# Patient Record
Sex: MALE | Race: WHITE | NOT HISPANIC OR LATINO | Employment: PART TIME | URBAN - METROPOLITAN AREA
[De-identification: names, ages, dates, MRNs, and addresses within clinical notes are randomized per-mention and may not be internally consistent; named-entity substitution may affect disease eponyms.]

---

## 2018-10-12 ENCOUNTER — OFFICE VISIT (OUTPATIENT)
Dept: URGENT CARE | Facility: CLINIC | Age: 33
End: 2018-10-12

## 2018-10-12 VITALS
SYSTOLIC BLOOD PRESSURE: 115 MMHG | DIASTOLIC BLOOD PRESSURE: 70 MMHG | OXYGEN SATURATION: 100 % | TEMPERATURE: 98 F | BODY MASS INDEX: 21.67 KG/M2 | RESPIRATION RATE: 18 BRPM | HEART RATE: 78 BPM | WEIGHT: 160 LBS | HEIGHT: 72 IN

## 2018-10-12 DIAGNOSIS — Z76.89 ENCOUNTER TO ESTABLISH CARE: ICD-10-CM

## 2018-10-12 DIAGNOSIS — R10.32 GROIN PAIN, LEFT: Primary | ICD-10-CM

## 2018-10-12 DIAGNOSIS — L98.9 SKIN LESION OF BACK: ICD-10-CM

## 2018-10-12 DIAGNOSIS — R53.83 FATIGUE, UNSPECIFIED TYPE: ICD-10-CM

## 2018-10-12 DIAGNOSIS — B35.6 TINEA CRURIS: ICD-10-CM

## 2018-10-12 LAB
BILIRUB UR QL STRIP: NEGATIVE
GLUCOSE UR QL STRIP: NEGATIVE
KETONES UR QL STRIP: NEGATIVE
LEUKOCYTE ESTERASE UR QL STRIP: NEGATIVE
PH, POC UA: 7
POC BLOOD, URINE: NEGATIVE
POC NITRATES, URINE: NEGATIVE
PROT UR QL STRIP: NEGATIVE
SP GR UR STRIP: 1.02 (ref 1–1.03)
UROBILINOGEN UR STRIP-ACNC: NORMAL (ref 0.3–2.2)

## 2018-10-12 PROCEDURE — 81003 URINALYSIS AUTO W/O SCOPE: CPT | Mod: QW,S$GLB,, | Performed by: NURSE PRACTITIONER

## 2018-10-12 PROCEDURE — 99000 SPECIMEN HANDLING OFFICE-LAB: CPT | Mod: S$GLB,,, | Performed by: NURSE PRACTITIONER

## 2018-10-12 PROCEDURE — 99203 OFFICE O/P NEW LOW 30 MIN: CPT | Mod: 25,S$GLB,, | Performed by: NURSE PRACTITIONER

## 2018-10-12 RX ORDER — KETOCONAZOLE 20 MG/G
CREAM TOPICAL 2 TIMES DAILY
Qty: 30 G | Refills: 0 | Status: SHIPPED | OUTPATIENT
Start: 2018-10-12 | End: 2018-10-22

## 2018-10-12 NOTE — PATIENT INSTRUCTIONS
Follow up with internal med and dermatology as we discussed. Call [phone] to schedule an appointment     Internal med- fatigue workup and labs.   Dermatology for lesion on back     If you've had labs sent out, it will generally take 4-7 days for results to return. We will call you with the results.      Please return here or go to the Emergency Department for any concerns or worsening of condition.  If you were prescribed antibiotics, please take them to completion.  If you were prescribed a narcotic medication, do not drive or operate heavy equipment or machinery while taking these medications.  Please follow up with your primary care doctor or specialist as needed.    If you  smoke, please stop smoking.      Jock Itch (Tinea Cruris, General)  Jock itch (tinea cruris) is a red, itchy rash in the groin caused by a fungal infection. It occurs in skin folds where it is warm and moist. It commonly starts as a small, red, itchy patch that grows larger. The patch is usually in the shape of a round ring, 1 to 2 inches wide. It may cause the skin to flake. It may also spread to the scrotum or the skin that covers your testicles. This infection is treated with skin creams or oral medicine.  Home care  · If you were prescribed a cream, use it exactly as directed. You can buy some antifungal creams without a prescription.  · It may take a week before the fungus starts to go away. It can take about 2 to 3 weeks to completely clear. To stop the rash from coming back, keep using the medicine until the rash is all gone.  · Wash the area at least once a day with soap and water. Pat dry and apply medicine.   · Wear loose-fitting underwear to let your skin breathe. Change your underwear daily.  · Once the rash is gone, keep the area clean and dry to prevent reinfection. If recurrence is a problem, use a medicated antifungal powder daily. This is available over the counter.  Prevention  The following tips may help prevent jock  itch:  · Don't share clothes, towels, or sports gear with others unless they have been washed.  · Change your underwear daily.  · Keep skin clean and dry, especially after showering or swimming.  · Lose weight.  · Do not wear tight underwear.  · Treat athlete's foot if it occurs.  Follow-up care  Follow up with your healthcare provider, or as advised. Call your provider if the rash is not starting to improve after 10 days of treatment, or if the rash continues to spread.  When to seek medical advice  Call your healthcare provider right away if any of these occur:  · Increasing pain in the rash area  · Redness that spreads around the rash  · Fluid draining from the rash  · Rash returns soon after treatment  · Fever of 100.4°F (38°C) or higher, or as directed by your provider  Date Last Reviewed: 8/1/2016  © 1373-6127 [employer]. [address] [place] [address]. All rights reserved. This information is not intended as a substitute for professional medical care. Always follow your healthcare professional's instructions.        Managing Fatigue     Family members can help with meals and chores around the house.   Fatigue is common. It can be caused by worry, lack of sleep, or poor appetite. Fatigue can also be a sign of anemia, a shortage of red blood cells. You might need medical treatment for anemia. The tips below can help you feel better.  Conserving energy  · Keep track of the times of day when you are most tired and plan around them. For instance, if you are more tired in the afternoon, try to get tasks done in the morning.  · Decide which tasks are most important. Do those first.  · Pass tasks along to others when you need to. Ask for help.  · Accept help when its offered. Tell people what they can do to help. For instance, you may need someone to fix a meal, fold clothes, or put gas in your car.  · Plan rest times. You may want to take a nap each day. Just sitting quietly for a few  minutes can make you feel more rested.  What you can do to feel better  · Relax before you try to sleep. Take a bath or read for a while.  · Form a sleep pattern. Go to bed at the same time each night and get up at the same time each morning.  · Eat well. Choose foods from all of the food groups each day.  · Exercise. Take a brisk walk to help increase your energy.  · Avoid caffeine and alcohol. Drink plenty of water or fruit juices instead.  Treating anemia  If you begin to feel more tired than normal, tell your doctor. Fatigue could be a sign of anemia. This problem is fairly common in cancer patients, especially during chemotherapy and radiation treatments. If your red blood cell count is too low, you may get a blood transfusion. In some cases, you may need medicine to increase the number of red blood cells your body makes.  When to call your healthcare provider  Call your healthcare provider if you have:  · Shortness of breath or chest pain  · A dizzy feeling when you get up from lying or sitting down  · Paler skin than normal  · Extreme tiredness that is not helped by sleep   Date Last Reviewed: 1/3/2016  © 4389-8961 [employer]. [address] [place] [address], [place] [address]. All rights reserved. This information is not intended as a substitute for professional medical care. Always follow your healthcare professional's instructions.

## 2018-10-13 LAB
C TRACH RRNA SPEC QL NAA+PROBE: NEGATIVE
N GONORRHOEA RRNA SPEC QL NAA+PROBE: NEGATIVE

## 2019-02-07 ENCOUNTER — OFFICE VISIT (OUTPATIENT)
Dept: URGENT CARE | Facility: CLINIC | Age: 34
End: 2019-02-07

## 2019-02-07 VITALS
HEART RATE: 76 BPM | DIASTOLIC BLOOD PRESSURE: 69 MMHG | HEIGHT: 72 IN | SYSTOLIC BLOOD PRESSURE: 111 MMHG | WEIGHT: 160 LBS | BODY MASS INDEX: 21.67 KG/M2 | RESPIRATION RATE: 16 BRPM | OXYGEN SATURATION: 98 % | TEMPERATURE: 97 F

## 2019-02-07 DIAGNOSIS — R30.0 DYSURIA: ICD-10-CM

## 2019-02-07 DIAGNOSIS — N34.2 URETHRITIS: Primary | ICD-10-CM

## 2019-02-07 LAB
BILIRUB UR QL STRIP: NEGATIVE
GLUCOSE UR QL STRIP: NEGATIVE
KETONES UR QL STRIP: NEGATIVE
LEUKOCYTE ESTERASE UR QL STRIP: NEGATIVE
PH, POC UA: 6 (ref 5–8)
POC BLOOD, URINE: NEGATIVE
POC NITRATES, URINE: NEGATIVE
PROT UR QL STRIP: NEGATIVE
SP GR UR STRIP: 1.02 (ref 1–1.03)
UROBILINOGEN UR STRIP-ACNC: NEGATIVE (ref 0.3–2.2)

## 2019-02-07 PROCEDURE — 99000 PR SPECIMEN HANDLING,DR OFF->LAB: ICD-10-PCS | Mod: S$GLB,,, | Performed by: EMERGENCY MEDICINE

## 2019-02-07 PROCEDURE — 99000 SPECIMEN HANDLING OFFICE-LAB: CPT | Mod: S$GLB,,, | Performed by: EMERGENCY MEDICINE

## 2019-02-07 PROCEDURE — 81003 URINALYSIS AUTO W/O SCOPE: CPT | Mod: QW,S$GLB,, | Performed by: EMERGENCY MEDICINE

## 2019-02-07 PROCEDURE — 99214 PR OFFICE/OUTPT VISIT, EST, LEVL IV, 30-39 MIN: ICD-10-PCS | Mod: 25,S$GLB,, | Performed by: EMERGENCY MEDICINE

## 2019-02-07 PROCEDURE — 81003 POCT URINALYSIS, DIPSTICK, AUTOMATED, W/O SCOPE: ICD-10-PCS | Mod: QW,S$GLB,, | Performed by: EMERGENCY MEDICINE

## 2019-02-07 PROCEDURE — 99214 OFFICE O/P EST MOD 30 MIN: CPT | Mod: 25,S$GLB,, | Performed by: EMERGENCY MEDICINE

## 2019-02-07 RX ORDER — AZITHROMYCIN 500 MG/1
1000 TABLET, FILM COATED ORAL ONCE
Qty: 2 TABLET | Refills: 0 | Status: SHIPPED | OUTPATIENT
Start: 2019-02-07 | End: 2019-02-07

## 2019-02-07 RX ORDER — AZITHROMYCIN 250 MG/1
TABLET, FILM COATED ORAL
Qty: 6 TABLET | Refills: 0 | Status: SHIPPED | OUTPATIENT
Start: 2019-02-07 | End: 2019-02-07 | Stop reason: CLARIF

## 2019-02-07 RX ORDER — VALACYCLOVIR HYDROCHLORIDE 1 G/1
TABLET, FILM COATED ORAL
COMMUNITY
Start: 2016-01-21

## 2019-02-07 RX ORDER — HYDROCODONE BITARTRATE AND ACETAMINOPHEN 5; 325 MG/1; MG/1
1 TABLET ORAL
COMMUNITY
Start: 2016-01-21

## 2019-02-08 NOTE — PATIENT INSTRUCTIONS
We will contact you if culture results are positive for other bacteria tested and that would indicate the need to come back in for a shot of Rocephin.  Azithromycin prescription covering you for chlamydial infection of the urethra.  Quick urinalysis negative for bacterial bladder infection.  Review urethritis sheet.  Return for any concerns or problems.  We will call you with the results as soon as they come in.        Urethritis, Infection vs. Chemical (Adult Male)  You have urethritis. This means an inflammation in your urethra. The urethra is the tube that drains the urine out of your bladder through the tip of the penis. Urethritis is most often caused by a bacterial infection. The infection may be from gonorrhea, chlamydia, or another sexually transmitted disease (STD). But other things can also cause it. These things include irritation from soap, lotion, deodorant, or spermicides. The cause of your urethritis is uncertain.  Women with urethritis often don't have symptoms. Men are more likely to have symptoms. Symptoms can start within 1 week to a month or more after infection. Symptoms can include:  · Burning or pain when urinating  · Your penis feels irritated  · Pus coming from your penis  · Pain and possible swelling in one or both testicles  Urethritis caused by bacteria is treated with antibiotics. Urethritis may clear up in a few weeks or months, even without treatment. But if you don't get treatment, the bacteria that cause the infection can stay in the urethra. Even if symptoms go away, you can still have the infection. And you can spread it to others.  Your sex partner or partners also need to be treated. This is true even if they have no symptoms. You can get infected again if they aren't treated and you have sex with them. Your partner should call his or her healthcare provider to be looked at and treated.  Your urethritis may also be caused by things other than bacteria. These causes  include:  · Chemical irritation from a product used in the genital area. This might be soap, lotions, deodorant, or spermicides. Symptoms usually get better within 3 days after the last time you used the product.  · Damage to the urethra caused by vigorous sex or masturbation  · Damage to the urethra caused by inserting an object into it. This could happen during an operation in the hospital. A thin, plastic tube (catheter) is put into your bladder to let urine to drain from the bladder during the operation.  · Long-term (chronic) urethritis that lasts for weeks or months, or goes away and comes back. This kind of urethritis may be caused by a narrowed urethra or an untreated bacterial infection. You may need to see a specialist for diagnosis and treatment.  Home care  The following guidelines will help you care for yourself at home:  · Stop using any soap, lotions, or other chemicals that may cause irritation.  · If you were given antibiotics, take them until they are all gone, or until your healthcare provider tells you to stop. It's important to finish the antibiotics even if your symptoms go away. This is to make sure the infection has completely cleared up.  · Don't have sex until both you and your partner have finished all of the antibiotics, and your provider tells you that you cannot pass on the infection.  · You can take acetaminophen or ibuprofen for pain, unless you were given a different pain medicine to use. If you have chronic liver or kidney disease, talk with your provider before taking these medicines. Also talk with your provider if you've had a stomach ulcer or GI bleeding or are taking blood thinner medicines.  · Dont give aspirin to anyone under 18 years of age who is ill with a fever. It may cause severe liver damage.  · Learn about safe sex practices and use them. The safest sex is with a partner who does not have an STD and only has sex with you. Condoms can keep you from getting some  STDs. These include gonorrhea, chlamydia, and HIV. But condoms are not a guarantee you will not get these diseases.  Follow-up care  Follow up with your healthcare provider, or as advised. If an STD culture was taken, call as directed for the result. If you are diagnosed with an STD, follow up with your provider or your local health department. You should have a complete STD screening, including HIV testing. For more information, call the CDC-INFO at 797-338-5023.  When to seek medical advice  Call your healthcare provider right away if any of these occur:  · You don't start to get better after 3 days of treatment.  · You cant urinate because of the pain  · Rash or joint pain  · Painful sores on the penis  · Enlarged, painful lumps (lymph nodes) in your groin  · Testicle pain or your scrotum swells   Date Last Reviewed: 10/1/2016  © 5239-0823 [employer]. [address] [place] [address], [place] [address]. All rights reserved. This information is not intended as a substitute for professional medical care. Always follow your healthcare professional's instructions.

## 2019-02-08 NOTE — PROGRESS NOTES
Subjective:       Patient ID: Kishan Huff is a 33 y.o. male.    Vitals:    02/07/19 1806   BP: 111/69   Pulse: 76   Resp: 16   Temp: 97.2 °F (36.2 °C)   TempSrc: Oral   SpO2: 98%   Weight: 72.6 kg (160 lb)   Height: 6' (1.829 m)       Chief Complaint: Dysuria    Patient has had urinary symptoms for about a week.  Patient does have a history of chlamydial infection however denies any sort of penile discharge or testicular swelling or fevers or chills or abdominal pain. He states some mild end extreme urgency or incomplete voiding.  I have given him all options of testing and treat, treating and testing later, testing only, treating only and he has chosen the option of testing for both gonorrhea and chlamydia and empirically treating for chlamydia and that if the gonorrhea is positive he will come back in for the Rocephin shot.  He denies hematuria he denies flank pain he denies penile rash or lesions or scrotal swelling or testicular pain.      Dysuria    This is a new problem. Episode onset: 1 week. The problem occurs every urination. The problem has been gradually worsening. The quality of the pain is described as aching (pressure). The pain is at a severity of 4/10. There has been no fever. He is sexually active. There is no history of pyelonephritis. Associated symptoms include hesitancy. Pertinent negatives include no chills, hematuria, nausea, urgency, vomiting or rash. He has tried nothing for the symptoms.     Review of Systems   Constitution: Negative for chills and fever.   Eyes: Negative for discharge.   Skin: Negative for flushing and rash.   Musculoskeletal: Negative for back pain.   Gastrointestinal: Negative for nausea and vomiting.   Genitourinary: Positive for dysuria and hesitancy. Negative for genital sores, hematuria and urgency.        Skin near scrotum feels sticky  Urine leaks out after urination       Objective:      Physical Exam   Constitutional: He is oriented to person, place,  and time. He appears well-developed and well-nourished. No distress.   HENT:   Head: Normocephalic and atraumatic.   Right Ear: External ear normal.   Left Ear: External ear normal.   Nose: Nose normal. No nasal deformity. No epistaxis.   Mouth/Throat: Oropharynx is clear and moist and mucous membranes are normal.   Eyes: Conjunctivae and lids are normal.   Neck: Trachea normal, normal range of motion and phonation normal. Neck supple.   Cardiovascular: Normal rate, regular rhythm, normal heart sounds and intact distal pulses.   Pulmonary/Chest: Effort normal and breath sounds normal.   Abdominal: Soft. Normal appearance and bowel sounds are normal. He exhibits no distension. There is no tenderness. There is no CVA tenderness.   Musculoskeletal: Normal range of motion.   Neurological: He is alert and oriented to person, place, and time. He has normal reflexes.   Skin: Skin is warm, dry and intact. He is not diaphoretic.   Psychiatric: He has a normal mood and affect. His speech is normal and behavior is normal. Judgment and thought content normal. Cognition and memory are normal.   Nursing note and vitals reviewed.        Office Visit on 02/07/2019   Component Date Value Ref Range Status    POC Blood, Urine 02/07/2019 Negative  Negative Final    POC Bilirubin, Urine 02/07/2019 Negative  Negative Final    POC Urobilinogen, Urine 02/07/2019 Negative  0.3 - 2.2 Final    POC Ketones, Urine 02/07/2019 Negative  Negative Final    POC Protein, Urine 02/07/2019 Negative  Negative Final    POC Nitrates, Urine 02/07/2019 Negative  Negative Final    POC Glucose, Urine 02/07/2019 Negative  Negative Final    pH, UA 02/07/2019 6.0  5 - 8 Final    POC Specific Gravity, Urine 02/07/2019 1.025  1.003 - 1.029 Final    POC Leukocytes, Urine 02/07/2019 Negative  Negative Final       Assessment:       1. Urethritis    2. Dysuria        Plan:       Kishan was seen today for dysuria.    Diagnoses and all orders for this  visit:    Urethritis  -     C. trachomatis/N. gonorrhoeae by AMP DNA [facility]; Urine    Dysuria  -     POCT Urinalysis, Dipstick, Automated, W/O Scope  -     C. trachomatis/N. gonorrhoeae by AMP DNA [facility]; Urine    Other orders  -     Discontinue: azithromycin (Z-AMALIA) 250 MG tablet; Take 2 tablets by mouth on day 1; Take 1 tablet by mouth on days 2-5  -     azithromycin (ZITHROMAX) 500 MG tablet; Take 2 tablets (1,000 mg total) by mouth once. for 1 dose          Patient Instructions   We will contact you if culture results are positive for other bacteria tested and that would indicate the need to come back in for a shot of Rocephin.  Azithromycin prescription covering you for chlamydial infection of the urethra.  Quick urinalysis negative for bacterial bladder infection.  Review urethritis sheet.  Return for any concerns or problems.  We will call you with the results as soon as they come in.        Urethritis, Infection vs. Chemical (Adult Male)  You have urethritis. This means an inflammation in your urethra. The urethra is the tube that drains the urine out of your bladder through the tip of the penis. Urethritis is most often caused by a bacterial infection. The infection may be from gonorrhea, chlamydia, or another sexually transmitted disease (STD). But other things can also cause it. These things include irritation from soap, lotion, deodorant, or spermicides. The cause of your urethritis is uncertain.  Women with urethritis often don't have symptoms. Men are more likely to have symptoms. Symptoms can start within 1 week to a month or more after infection. Symptoms can include:  · Burning or pain when urinating  · Your penis feels irritated  · Pus coming from your penis  · Pain and possible swelling in one or both testicles  Urethritis caused by bacteria is treated with antibiotics. Urethritis may clear up in a few weeks or months, even without treatment. But if you don't get treatment, the bacteria that  cause the infection can stay in the urethra. Even if symptoms go away, you can still have the infection. And you can spread it to others.  Your sex partner or partners also need to be treated. This is true even if they have no symptoms. You can get infected again if they aren't treated and you have sex with them. Your partner should call his or her healthcare provider to be looked at and treated.  Your urethritis may also be caused by things other than bacteria. These causes include:  · Chemical irritation from a product used in the genital area. This might be soap, lotions, deodorant, or spermicides. Symptoms usually get better within 3 days after the last time you used the product.  · Damage to the urethra caused by vigorous sex or masturbation  · Damage to the urethra caused by inserting an object into it. This could happen during an operation in the hospital. A thin, plastic tube (catheter) is put into your bladder to let urine to drain from the bladder during the operation.  · Long-term (chronic) urethritis that lasts for weeks or months, or goes away and comes back. This kind of urethritis may be caused by a narrowed urethra or an untreated bacterial infection. You may need to see a specialist for diagnosis and treatment.  Home care  The following guidelines will help you care for yourself at home:  · Stop using any soap, lotions, or other chemicals that may cause irritation.  · If you were given antibiotics, take them until they are all gone, or until your healthcare provider tells you to stop. It's important to finish the antibiotics even if your symptoms go away. This is to make sure the infection has completely cleared up.  · Don't have sex until both you and your partner have finished all of the antibiotics, and your provider tells you that you cannot pass on the infection.  · You can take acetaminophen or ibuprofen for pain, unless you were given a different pain medicine to use. If you have chronic  liver or kidney disease, talk with your provider before taking these medicines. Also talk with your provider if you've had a stomach ulcer or GI bleeding or are taking blood thinner medicines.  · Dont give aspirin to anyone under 18 years of age who is ill with a fever. It may cause severe liver damage.  · Learn about safe sex practices and use them. The safest sex is with a partner who does not have an STD and only has sex with you. Condoms can keep you from getting some STDs. These include gonorrhea, chlamydia, and HIV. But condoms are not a guarantee you will not get these diseases.  Follow-up care  Follow up with your healthcare provider, or as advised. If an STD culture was taken, call as directed for the result. If you are diagnosed with an STD, follow up with your provider or your local health department. You should have a complete STD screening, including HIV testing. For more information, call the CDC-INFO at 497-016-9257.  When to seek medical advice  Call your healthcare provider right away if any of these occur:  · You don't start to get better after 3 days of treatment.  · You cant urinate because of the pain  · Rash or joint pain  · Painful sores on the penis  · Enlarged, painful lumps (lymph nodes) in your groin  · Testicle pain or your scrotum swells   Date Last Reviewed: 10/1/2016  © 4071-9328 [employer]. [address] [place] [address], [place] [address]. All rights reserved. This information is not intended as a substitute for professional medical care. Always follow your healthcare professional's instructions.

## 2019-02-12 LAB
C TRACH RRNA SPEC QL NAA+PROBE: NEGATIVE
N GONORRHOEA RRNA SPEC QL NAA+PROBE: NEGATIVE

## 2019-07-29 ENCOUNTER — OFFICE VISIT (OUTPATIENT)
Dept: URGENT CARE | Facility: CLINIC | Age: 34
End: 2019-07-29

## 2019-07-29 VITALS
HEART RATE: 74 BPM | WEIGHT: 170 LBS | TEMPERATURE: 98 F | HEIGHT: 72 IN | OXYGEN SATURATION: 99 % | DIASTOLIC BLOOD PRESSURE: 69 MMHG | SYSTOLIC BLOOD PRESSURE: 106 MMHG | BODY MASS INDEX: 23.03 KG/M2 | RESPIRATION RATE: 20 BRPM

## 2019-07-29 DIAGNOSIS — R30.0 DYSURIA: ICD-10-CM

## 2019-07-29 DIAGNOSIS — R10.9 ACUTE RIGHT FLANK PAIN: Primary | ICD-10-CM

## 2019-07-29 DIAGNOSIS — R82.90 ABNORMAL URINE FINDING: ICD-10-CM

## 2019-07-29 LAB
BILIRUB UR QL STRIP: POSITIVE
GLUCOSE UR QL STRIP: NEGATIVE
KETONES UR QL STRIP: NEGATIVE
LEUKOCYTE ESTERASE UR QL STRIP: POSITIVE
PH, POC UA: 5.5 (ref 5–8)
POC BLOOD, URINE: NEGATIVE
POC NITRATES, URINE: NEGATIVE
PROT UR QL STRIP: POSITIVE
SP GR UR STRIP: 1.03 (ref 1–1.03)
UROBILINOGEN UR STRIP-ACNC: NORMAL (ref 0.3–2.2)

## 2019-07-29 PROCEDURE — 99203 PR OFFICE/OUTPT VISIT, NEW, LEVL III, 30-44 MIN: ICD-10-PCS | Mod: 25,S$GLB,, | Performed by: EMERGENCY MEDICINE

## 2019-07-29 PROCEDURE — 74018 XR ABDOMEN AP 1 VIEW: ICD-10-PCS | Mod: TIER,S$GLB,, | Performed by: RADIOLOGY

## 2019-07-29 PROCEDURE — 81003 POCT URINALYSIS, DIPSTICK, AUTOMATED, W/O SCOPE: ICD-10-PCS | Mod: QW,S$GLB,, | Performed by: EMERGENCY MEDICINE

## 2019-07-29 PROCEDURE — 96372 THER/PROPH/DIAG INJ SC/IM: CPT | Mod: S$GLB,,, | Performed by: EMERGENCY MEDICINE

## 2019-07-29 PROCEDURE — 87086 URINE CULTURE/COLONY COUNT: CPT

## 2019-07-29 PROCEDURE — 81003 URINALYSIS AUTO W/O SCOPE: CPT | Mod: QW,S$GLB,, | Performed by: EMERGENCY MEDICINE

## 2019-07-29 PROCEDURE — 96372 PR INJECTION,THERAP/PROPH/DIAG2ST, IM OR SUBCUT: ICD-10-PCS | Mod: S$GLB,,, | Performed by: EMERGENCY MEDICINE

## 2019-07-29 PROCEDURE — 99000 PR SPECIMEN HANDLING,DR OFF->LAB: ICD-10-PCS | Mod: S$GLB,,, | Performed by: EMERGENCY MEDICINE

## 2019-07-29 PROCEDURE — 99203 OFFICE O/P NEW LOW 30 MIN: CPT | Mod: 25,S$GLB,, | Performed by: EMERGENCY MEDICINE

## 2019-07-29 PROCEDURE — 99000 SPECIMEN HANDLING OFFICE-LAB: CPT | Mod: S$GLB,,, | Performed by: EMERGENCY MEDICINE

## 2019-07-29 PROCEDURE — 74018 RADEX ABDOMEN 1 VIEW: CPT | Mod: TIER,S$GLB,, | Performed by: RADIOLOGY

## 2019-07-29 RX ORDER — CIPROFLOXACIN 500 MG/1
500 TABLET ORAL EVERY 12 HOURS
Qty: 14 TABLET | Refills: 0 | Status: SHIPPED | OUTPATIENT
Start: 2019-07-29 | End: 2019-08-05

## 2019-07-29 RX ORDER — IBUPROFEN 600 MG/1
600 TABLET ORAL EVERY 6 HOURS PRN
Qty: 20 TABLET | Refills: 0 | Status: SHIPPED | OUTPATIENT
Start: 2019-07-29 | End: 2019-08-03

## 2019-07-29 RX ORDER — KETOROLAC TROMETHAMINE 30 MG/ML
30 INJECTION, SOLUTION INTRAMUSCULAR; INTRAVENOUS
Status: COMPLETED | OUTPATIENT
Start: 2019-07-29 | End: 2019-07-29

## 2019-07-29 RX ADMIN — KETOROLAC TROMETHAMINE 30 MG: 30 INJECTION, SOLUTION INTRAMUSCULAR; INTRAVENOUS at 09:07

## 2019-07-29 NOTE — PROGRESS NOTES
Subjective:       Patient ID: Kishan Huff is a 34 y.o. male.    Vitals:  height is 6' (1.829 m) and weight is 77.1 kg (170 lb). His temperature is 97.5 °F (36.4 °C). His blood pressure is 106/69 and his pulse is 74. His respiration is 20 and oxygen saturation is 99%.     Chief Complaint: Back Pain (More on the right side )    Patient is a 34-year-old male without any significant past medical history presenting to the urgent care clinic with complaints of 8 or 9 days of symptoms which include a dull pulling type of pain of the right flank into the right lower abdomen wall and right groin.  He states intermittently over the last week he has had some very mild dysuria described as burning.  No penile discharge no blood in the urine.  He states the pain is present every single day.  He has not had any change of appetite or fever or chills or nausea or vomiting or diarrhea.  He started working out about 10-12 days ago and it has been cardiovascular exercise like doing the ellipse.  He states that once or twice before exactly like this after starting exercise he has had the exact same problem with pain in the right flank and lower groin on the right.  He has no subjective concern for STD.    Back Pain   This is a new problem. The current episode started in the past 7 days (Started 8 days ago ). The problem occurs constantly. The problem is unchanged. Quality: gen pain  The pain is at a severity of 5/10. The pain is mild. Associated symptoms include abdominal pain. Pertinent negatives include no chest pain, dysuria, fever or headaches. (Pain start from the abdominal to the back ) He has tried ice for the symptoms. The treatment provided no relief.       Constitution: Negative for chills, fatigue and fever.   HENT: Negative for congestion and sore throat.    Neck: Negative for painful lymph nodes.   Cardiovascular: Negative for chest pain and leg swelling.   Eyes: Negative for double vision and blurred vision.    Respiratory: Negative for cough and shortness of breath.    Gastrointestinal: Positive for abdominal pain. Negative for nausea, vomiting and diarrhea.   Genitourinary: Negative for dysuria, frequency and urgency.   Musculoskeletal: Positive for pain and back pain. Negative for joint pain, joint swelling, muscle cramps and muscle ache.   Skin: Negative for color change, pale and rash.   Allergic/Immunologic: Negative for seasonal allergies.   Neurological: Negative for dizziness, history of vertigo, light-headedness, passing out and headaches.   Hematologic/Lymphatic: Negative for swollen lymph nodes, easy bruising/bleeding and history of blood clots. Does not bruise/bleed easily.   Psychiatric/Behavioral: Negative for nervous/anxious, sleep disturbance and depression. The patient is not nervous/anxious.        Objective:      Physical Exam   Constitutional: He is oriented to person, place, and time. Vital signs are normal. He appears well-developed and well-nourished. He is active and cooperative. No distress.   HENT:   Head: Normocephalic and atraumatic.   Nose: Nose normal.   Mouth/Throat: Oropharynx is clear and moist and mucous membranes are normal.   Eyes: Conjunctivae and lids are normal.   Neck: Trachea normal, normal range of motion, full passive range of motion without pain and phonation normal. Neck supple.   Cardiovascular: Normal rate, regular rhythm, normal heart sounds, intact distal pulses and normal pulses.   Pulmonary/Chest: Effort normal and breath sounds normal.   Abdominal: Soft. Normal appearance and bowel sounds are normal. He exhibits no abdominal bruit, no pulsatile midline mass and no mass.   Musculoskeletal: He exhibits no edema, tenderness or deformity.   Currently is not having pain and not tender to palpation of the right back or flank or side or abdominal wall or groin.  There is no pain on hip flexion or external or internal rotation.  Right lower quadrant is nontender and certainly  no rebound.  Allows full palpation of the entire abdomen without tenderness to palpation.  No hernia noted no skin lesions noted.   Neurological: He is alert and oriented to person, place, and time. He has normal strength and normal reflexes. No sensory deficit.   Skin: Skin is warm, dry and intact. He is not diaphoretic.   Psychiatric: He has a normal mood and affect. His speech is normal and behavior is normal. Judgment and thought content normal. Cognition and memory are normal.   Nursing note and vitals reviewed.      Differential diagnosis includes intra-abdominal processes including cholecystitis cholelithiasis appendicitis uro lithiasis UTI , however with lack of fever, change in appetite, nausea or vomiting, as well as lack of tenderness to palpation in the abdomen, as well as timeframe of 8 or 9 days of consistent symptoms, much less likely to be acute intra-abdominal emergency.  He has had some mild dysuria described as a burning sensation at times associated with the right-sided flank pain and also states that the pain started 2-3 days after exercising which includes doing the ellipse with elongation of the hip flexor muscles and thigh muscles and abdominal wall muscles with a twisting motion.  He has no history of kidney stones.  Throat and does not meet requirements for CT scan or emergent labs, however given specific instructions for appendicitis and cholecystitis and other ER precautions for the same.  Will get urinalysis, 30 mg of Toradol shot given and get KUB to assess for constipation, dilated loops of bowel, kidney stone, any blood in the urine or infection.  Will treat with anti-inflammatory at this time.  Given follow-up.    Office Visit on 07/29/2019   Component Date Value Ref Range Status    POC Blood, Urine 07/29/2019 Negative  Negative Final    POC Bilirubin, Urine 07/29/2019 Positive* Negative Final    0.5    POC Urobilinogen, Urine 07/29/2019 Normal  0.3 - 2.2 Final    POC Ketones,  Urine 07/29/2019 Negative  Negative Final    POC Protein, Urine 07/29/2019 Positive* Negative Final    10    POC Nitrates, Urine 07/29/2019 Negative  Negative Final    POC Glucose, Urine 07/29/2019 Negative  Negative Final    pH, UA 07/29/2019 5.5  5 - 8 Final    POC Specific Gravity, Urine 07/29/2019 1.030* 1.003 - 1.029 Final    POC Leukocytes, Urine 07/29/2019 Positive* Negative Final    10     X-ray Abdomen Ap 1 View    Result Date: 7/29/2019  EXAMINATION: XR ABDOMEN AP 1 VIEW CLINICAL HISTORY: flank pain; Unspecified abdominal pain TECHNIQUE: AP View(s) of the abdomen was performed. COMPARISON: None FINDINGS: No bowel dilatation.  No definite free air in the abdomen.  Small pelvic calcification probably phleboliths but a small distal ureteral calculus cannot be ruled out.  No definite intrarenal calcifications.     See above Electronically signed by: Tony Villeda MD Date:    07/29/2019 Time:    09:59      Assessment:       1. Acute right flank pain    2. Abnormal urine finding    3. Dysuria        Plan:         Acute right flank pain  -     POCT Urinalysis, Dipstick, Automated, W/O Scope  -     X-Ray Abdomen AP 1 View; Future; Expected date: 07/29/2019  -     Culture, Urine    Abnormal urine finding  -     Culture, Urine    Dysuria  -     X-Ray Abdomen AP 1 View; Future; Expected date: 07/29/2019    Other orders  -     ketorolac injection 30 mg  -     ciprofloxacin HCl (CIPRO) 500 MG tablet; Take 1 tablet (500 mg total) by mouth every 12 (twelve) hours. for 7 days  Dispense: 14 tablet; Refill: 0  -     ibuprofen (ADVIL,MOTRIN) 600 MG tablet; Take 1 tablet (600 mg total) by mouth every 6 (six) hours as needed for Pain or Temperature greater than (100.4).  Dispense: 20 tablet; Refill: 0          Patient Instructions   Ciprofloxacin prescription antibiotic until culture results are back  Ibuprofen 600 mg prescription for pain and inflammation  Urinalysis abnormal however not definitive for kidney stone or  urinary tract infection so sent for a culture.  We will cover you empirically with antibiotics until the culture results and we will call you with results in about 2-3 business days  X-ray shows some nonspecific findings including increased amount of stool, a calcification on the right side which may be phlebolith in the stool or a small kidney stone.  Keep your stool soft with MiraLax or Colace twice daily and avoid straining.  Review flank pain and abdominal pain sheet  Take over-the-counter AZO for any persistent painful or burning urination    Please go to the emergency department if having failure to improve, fevers or vomiting with this pain, tenderness in the right lower quadrant of the abdomen as this would be more concerning for appendicitis.  There is also no hernia present at this time.    Follow-up with PCP    Review discharge instruction sheets including abdominal pain, flank pain, dysuria.      Flank Pain, Uncertain Cause  The flank is the area between your upper abdomen and your back. Pain there is often caused by a problem with your kidneys. It might be a kidney infection or a kidney stone. Other causes of flank pain include spinal arthritis, a pinched nerve from a back injury, or a back muscle strain or spasm.  The cause of your flank pain is not certain. You may need other tests.  Home care  Follow these tips when caring for yourself at home:  · You may use acetaminophen or ibuprofen to control pain, unless your health care provider prescribed another medicine. If you have chronic liver or kidney disease, talk with your provider before taking these medicines. Also talk with your provider first if youve ever had a stomach ulcer or GI bleeding.  · If the pain is coming from your muscles, you may get relief with ice or heat. During the first 2 days after the injury, put an ice pack on the painful area for 20 minutes every 2 to 4 hours. This will reduce swelling and pain. A hot shower, hot bath, or  heating pad works well for a muscle spasm. You can start with ice, then switch to heat after 2 days. You might find that alternating ice and heat works well. Use the method that feels the best to you.  Follow-up care  Follow up with your healthcare provider if your symptoms dont get better over the next few days.  When to seek medical advice  Call your healthcare provider right away if any of these happen:  · Repeated vomiting  · Fever of 100.4ºF (38ºC) or higher, or as directed by your health care provider  · Flank pain that gets worse  · Pain that spreads to the front of your belly (abdomen)  · Dizziness, weakness, or fainting  · Blood in your urine  · Burning feeling when you urinate or the need to urinate often  · Pain in one of your legs that gets worse  · Numbness or weakness in a leg  Date Last Reviewed: 10/1/2016  © 2544-0236 [employer]. [address] [place] [address]. All rights reserved. This information is not intended as a substitute for professional medical care. Always follow your healthcare professional's instructions.        Dysuria with Uncertain Cause (Adult)    The urethra is the tube that allows urine to pass out of the body. In a woman, the urethra is the opening above the vagina. In men, the urethra is the opening on the tip of the penis. Dysuria is the feeling of pain or burning in the urethra when passing urine.  Dysuria can be caused by anything that irritates or inflames the urethra. An infection or chemical irritation can cause this reaction. A bladder infection is the most common cause of dysuria in adults. A urine test can diagnose this. A bladder infection needs antibiotic treatment.  Soaps, lotions, colognes and feminine hygiene products can cause dysuria. So can birth control jellies, creams, and foams. It will go away 1 to 3 days after using these irritants.  Sexually transmitted diseases (STDs) such as chlamydia or gonorrhea can cause dysuria. Your  healthcare provider may take a culture sample. Your provider may start you on antibiotic medicine before the culture test returns.  In women who have gone through menopause, dysuria can be from dryness in the lining of the urethra. This can be treated with hormones. Dysuria becomes long-term (chronic) when it lasts for weeks or months. You may need to see a specialist (urologist) to diagnose and treat chronic dysuria.  Home care  These home care tips may help:  · Don't use any chemicals or products that you think may be causing your symptoms.  · If you were given a prescription medicine, take as directed. Be sure to take it until it is all used up.  · If a culture was taken, don't have sex until you have been told that it is negative. This means you don't have an infection. Then follow your healthcare provider's advice to treat your condition.  If a culture was done and it is positive:  · Both you and your sexual partner may need to be treated. This is true even if your partner has no symptoms.  · Contact your healthcare provider or go to an urgent care clinic or the public health department to be looked at and treated.  · Don't have sex until both you and your partner(s) have finished all antibiotics and your healthcare provider says you are no longer contagious.  · Learn about and use safe sex practices. The safest sex is with a partner who has tested negative and only has sex with you. Condoms can prevent STDs from spreading, but they aren't a guarantee.  Follow-up care  Follow up with your healthcare provider, or as advised. If a culture was taken, you may call as directed for the results. If you have an STD, follow up with your provider or the public health department for a complete STD screening, including HIV testing. For more information, contact CDC-INFO at 408-067-9971.  When to seek medical advice  Call your healthcare provider right away if any of these occur:  · You aren't better after 3 days of  treatment  · Fever of 100.4ºF (38ºC) or higher, or as directed by your healthcare provider  · Back or belly pain that gets worse  · You can't urinate because of pain  · New discharge from the urethra, vagina, or penis  · Painful sores on the penis  · Rash or joint pain  · Painful lumps (lymph nodes) in the groin  · Testicle pain or swelling of the scrotum  Date Last Reviewed: 11/1/2016 © 2000-2017 [employer]. [address] [place] [address]. All rights reserved. This information is not intended as a substitute for professional medical care. Always follow your healthcare professional's instructions.

## 2019-07-29 NOTE — PATIENT INSTRUCTIONS
Ciprofloxacin prescription antibiotic until culture results are back  Ibuprofen 600 mg prescription for pain and inflammation  Urinalysis abnormal however not definitive for kidney stone or urinary tract infection so sent for a culture.  We will cover you empirically with antibiotics until the culture results and we will call you with results in about 2-3 business days  X-ray shows some nonspecific findings including increased amount of stool, a calcification on the right side which may be phlebolith in the stool or a small kidney stone.  Keep your stool soft with MiraLax or Colace twice daily and avoid straining.  Review flank pain and abdominal pain sheet  Take over-the-counter AZO for any persistent painful or burning urination    Please go to the emergency department if having failure to improve, fevers or vomiting with this pain, tenderness in the right lower quadrant of the abdomen as this would be more concerning for appendicitis.  There is also no hernia present at this time.    Follow-up with PCP    Review discharge instruction sheets including abdominal pain, flank pain, dysuria.      Flank Pain, Uncertain Cause  The flank is the area between your upper abdomen and your back. Pain there is often caused by a problem with your kidneys. It might be a kidney infection or a kidney stone. Other causes of flank pain include spinal arthritis, a pinched nerve from a back injury, or a back muscle strain or spasm.  The cause of your flank pain is not certain. You may need other tests.  Home care  Follow these tips when caring for yourself at home:  · You may use acetaminophen or ibuprofen to control pain, unless your health care provider prescribed another medicine. If you have chronic liver or kidney disease, talk with your provider before taking these medicines. Also talk with your provider first if youve ever had a stomach ulcer or GI bleeding.  · If the pain is coming from your muscles, you may get relief with  ice or heat. During the first 2 days after the injury, put an ice pack on the painful area for 20 minutes every 2 to 4 hours. This will reduce swelling and pain. A hot shower, hot bath, or heating pad works well for a muscle spasm. You can start with ice, then switch to heat after 2 days. You might find that alternating ice and heat works well. Use the method that feels the best to you.  Follow-up care  Follow up with your healthcare provider if your symptoms dont get better over the next few days.  When to seek medical advice  Call your healthcare provider right away if any of these happen:  · Repeated vomiting  · Fever of 100.4ºF (38ºC) or higher, or as directed by your health care provider  · Flank pain that gets worse  · Pain that spreads to the front of your belly (abdomen)  · Dizziness, weakness, or fainting  · Blood in your urine  · Burning feeling when you urinate or the need to urinate often  · Pain in one of your legs that gets worse  · Numbness or weakness in a leg  Date Last Reviewed: 10/1/2016  © 0825-5184 [employer]. [address] [place] [address]. All rights reserved. This information is not intended as a substitute for professional medical care. Always follow your healthcare professional's instructions.        Dysuria with Uncertain Cause (Adult)    The urethra is the tube that allows urine to pass out of the body. In a woman, the urethra is the opening above the vagina. In men, the urethra is the opening on the tip of the penis. Dysuria is the feeling of pain or burning in the urethra when passing urine.  Dysuria can be caused by anything that irritates or inflames the urethra. An infection or chemical irritation can cause this reaction. A bladder infection is the most common cause of dysuria in adults. A urine test can diagnose this. A bladder infection needs antibiotic treatment.  Soaps, lotions, colognes and feminine hygiene products can cause dysuria. So can birth  control jellies, creams, and foams. It will go away 1 to 3 days after using these irritants.  Sexually transmitted diseases (STDs) such as chlamydia or gonorrhea can cause dysuria. Your healthcare provider may take a culture sample. Your provider may start you on antibiotic medicine before the culture test returns.  In women who have gone through menopause, dysuria can be from dryness in the lining of the urethra. This can be treated with hormones. Dysuria becomes long-term (chronic) when it lasts for weeks or months. You may need to see a specialist (urologist) to diagnose and treat chronic dysuria.  Home care  These home care tips may help:  · Don't use any chemicals or products that you think may be causing your symptoms.  · If you were given a prescription medicine, take as directed. Be sure to take it until it is all used up.  · If a culture was taken, don't have sex until you have been told that it is negative. This means you don't have an infection. Then follow your healthcare provider's advice to treat your condition.  If a culture was done and it is positive:  · Both you and your sexual partner may need to be treated. This is true even if your partner has no symptoms.  · Contact your healthcare provider or go to an urgent care clinic or the public health department to be looked at and treated.  · Don't have sex until both you and your partner(s) have finished all antibiotics and your healthcare provider says you are no longer contagious.  · Learn about and use safe sex practices. The safest sex is with a partner who has tested negative and only has sex with you. Condoms can prevent STDs from spreading, but they aren't a guarantee.  Follow-up care  Follow up with your healthcare provider, or as advised. If a culture was taken, you may call as directed for the results. If you have an STD, follow up with your provider or the public health department for a complete STD screening, including HIV testing. For more  information, contact CDC-INFO at 431-701-1520.  When to seek medical advice  Call your healthcare provider right away if any of these occur:  · You aren't better after 3 days of treatment  · Fever of 100.4ºF (38ºC) or higher, or as directed by your healthcare provider  · Back or belly pain that gets worse  · You can't urinate because of pain  · New discharge from the urethra, vagina, or penis  · Painful sores on the penis  · Rash or joint pain  · Painful lumps (lymph nodes) in the groin  · Testicle pain or swelling of the scrotum  Date Last Reviewed: 11/1/2016  © 2323-1095 [employer]. [address] [place] [address], [place] [address]. All rights reserved. This information is not intended as a substitute for professional medical care. Always follow your healthcare professional's instructions.

## 2019-07-30 LAB — BACTERIA UR CULT: NO GROWTH

## 2019-08-02 ENCOUNTER — TELEPHONE (OUTPATIENT)
Dept: URGENT CARE | Facility: CLINIC | Age: 34
End: 2019-08-02

## 2019-12-27 ENCOUNTER — OFFICE VISIT (OUTPATIENT)
Dept: URGENT CARE | Facility: CLINIC | Age: 34
End: 2019-12-27
Payer: COMMERCIAL

## 2019-12-27 VITALS
DIASTOLIC BLOOD PRESSURE: 76 MMHG | TEMPERATURE: 98 F | OXYGEN SATURATION: 100 % | WEIGHT: 170 LBS | HEIGHT: 72 IN | RESPIRATION RATE: 16 BRPM | SYSTOLIC BLOOD PRESSURE: 105 MMHG | BODY MASS INDEX: 23.03 KG/M2 | HEART RATE: 68 BPM

## 2019-12-27 DIAGNOSIS — K14.0 TONGUE INFLAMMATION: Primary | ICD-10-CM

## 2019-12-27 PROCEDURE — 99214 OFFICE O/P EST MOD 30 MIN: CPT | Mod: S$GLB,,, | Performed by: PHYSICIAN ASSISTANT

## 2019-12-27 PROCEDURE — 99214 PR OFFICE/OUTPT VISIT, EST, LEVL IV, 30-39 MIN: ICD-10-PCS | Mod: S$GLB,,, | Performed by: PHYSICIAN ASSISTANT
